# Patient Record
Sex: MALE | Race: WHITE | NOT HISPANIC OR LATINO | URBAN - METROPOLITAN AREA
[De-identification: names, ages, dates, MRNs, and addresses within clinical notes are randomized per-mention and may not be internally consistent; named-entity substitution may affect disease eponyms.]

---
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
## 2024-05-24 NOTE — ED PEDIATRIC NURSE NOTE - CHIEF COMPLAINT
Quality 226: Preventive Care And Screening: Tobacco Use: Screening And Cessation Intervention: Patient screened for tobacco use, is a smoker AND received Cessation Counseling within measurement period or in the six months prior to the measurement period Quality 431: Preventive Care And Screening: Unhealthy Alcohol Use - Screening: Patient not identified as an unhealthy alcohol user when screened for unhealthy alcohol use using a systematic screening method Quality 130: Documentation Of Current Medications In The Medical Record: Current Medications Documented Detail Level: Detailed Quality 47: Advance Care Plan: Advance Care Planning discussed and documented in the medical record; patient did not wish or was not able to name a surrogate decision maker or provide an advance care plan. The patient is a 13y Male complaining of

## 2024-07-02 NOTE — DISCHARGE NOTE NURSING/CASE MANAGEMENT/SOCIAL WORK - NS TRANSFER DISPOSITION PATIENT BELONGINGS
Medication: lisinopril  Last office visit date: 2/26/24  Medication Refill Protocol Failed.  Failed criteria: see below. Sent to clinician to review.       ACE Inhibitor Refill Protocol - 12 Month Protocol [id number]/30/2024 07:23 AM   Protocol Details eGFR resulted within last 12 months -- IF CRITERIA FAILED REFER TO PROTOCOL DETAILS    Last BP was under 140/90 or if patient has diabetes, CAD, or PVD, BP under 130/80 in past year -- IF CRITERIA FAILED REFER TO PROTOCOL DETAILS    Normal Potassium within last 12 months looking at last value -- IF CRITERIA FAILED REFER TO PROTOCOL DETAILS    eGFR greater than 10 resulted within last 12 months looking at last value      Thank you,   Refill Center Staff.      given to family

## 2024-07-31 ENCOUNTER — NON-APPOINTMENT (OUTPATIENT)
Age: 19
End: 2024-07-31

## 2025-01-17 NOTE — PATIENT PROFILE PEDIATRIC. - CONTRAINDICATIONS (SELECT ALL THAT APPLY)
Spoke with patient she stated she received a refill request from [facility] and stated she did need a refill. They sent the request to us first. I will contact the pharmacy to see if patient has refills    Moderate to severe illness with a fever. Delay administration of vaccination until patient has recovered